# Patient Record
Sex: FEMALE | Race: WHITE | URBAN - METROPOLITAN AREA
[De-identification: names, ages, dates, MRNs, and addresses within clinical notes are randomized per-mention and may not be internally consistent; named-entity substitution may affect disease eponyms.]

---

## 2020-12-11 ENCOUNTER — OFFICE VISIT (OUTPATIENT)
Dept: PRIMARY CARE CLINIC | Age: 40
End: 2020-12-11
Payer: COMMERCIAL

## 2020-12-11 VITALS
TEMPERATURE: 98.6 F | RESPIRATION RATE: 18 BRPM | HEART RATE: 90 BPM | BODY MASS INDEX: 32.47 KG/M2 | SYSTOLIC BLOOD PRESSURE: 120 MMHG | DIASTOLIC BLOOD PRESSURE: 70 MMHG | OXYGEN SATURATION: 95 % | HEIGHT: 64 IN | WEIGHT: 190.2 LBS

## 2020-12-11 PROCEDURE — 99213 OFFICE O/P EST LOW 20 MIN: CPT | Performed by: NURSE PRACTITIONER

## 2020-12-11 RX ORDER — AMOXICILLIN 875 MG/1
875 TABLET, COATED ORAL 2 TIMES DAILY
Qty: 20 TABLET | Refills: 0 | Status: SHIPPED | OUTPATIENT
Start: 2020-12-11 | End: 2020-12-21

## 2020-12-11 RX ORDER — FLUCONAZOLE 150 MG/1
TABLET ORAL
Qty: 2 TABLET | Refills: 0 | Status: SHIPPED | OUTPATIENT
Start: 2020-12-11

## 2020-12-11 SDOH — ECONOMIC STABILITY: INCOME INSECURITY: HOW HARD IS IT FOR YOU TO PAY FOR THE VERY BASICS LIKE FOOD, HOUSING, MEDICAL CARE, AND HEATING?: NOT HARD AT ALL

## 2020-12-11 SDOH — HEALTH STABILITY: MENTAL HEALTH: HOW MANY STANDARD DRINKS CONTAINING ALCOHOL DO YOU HAVE ON A TYPICAL DAY?: 1 OR 2

## 2020-12-11 SDOH — ECONOMIC STABILITY: TRANSPORTATION INSECURITY
IN THE PAST 12 MONTHS, HAS LACK OF TRANSPORTATION KEPT YOU FROM MEETINGS, WORK, OR FROM GETTING THINGS NEEDED FOR DAILY LIVING?: NO

## 2020-12-11 SDOH — ECONOMIC STABILITY: FOOD INSECURITY: WITHIN THE PAST 12 MONTHS, YOU WORRIED THAT YOUR FOOD WOULD RUN OUT BEFORE YOU GOT MONEY TO BUY MORE.: NEVER TRUE

## 2020-12-11 SDOH — ECONOMIC STABILITY: FOOD INSECURITY: WITHIN THE PAST 12 MONTHS, THE FOOD YOU BOUGHT JUST DIDN'T LAST AND YOU DIDN'T HAVE MONEY TO GET MORE.: NEVER TRUE

## 2020-12-11 SDOH — ECONOMIC STABILITY: TRANSPORTATION INSECURITY
IN THE PAST 12 MONTHS, HAS THE LACK OF TRANSPORTATION KEPT YOU FROM MEDICAL APPOINTMENTS OR FROM GETTING MEDICATIONS?: NO

## 2020-12-11 ASSESSMENT — ENCOUNTER SYMPTOMS
TROUBLE SWALLOWING: 0
VOMITING: 0
EYE ITCHING: 0
ABDOMINAL DISTENTION: 0
SINUS PAIN: 0
COUGH: 0
WHEEZING: 0
CONSTIPATION: 0
SORE THROAT: 0
EYE REDNESS: 0
RHINORRHEA: 0
DIARRHEA: 0
SHORTNESS OF BREATH: 0
APNEA: 0

## 2020-12-11 ASSESSMENT — PATIENT HEALTH QUESTIONNAIRE - PHQ9
2. FEELING DOWN, DEPRESSED OR HOPELESS: 0
SUM OF ALL RESPONSES TO PHQ QUESTIONS 1-9: 0
1. LITTLE INTEREST OR PLEASURE IN DOING THINGS: 0
SUM OF ALL RESPONSES TO PHQ QUESTIONS 1-9: 0
SUM OF ALL RESPONSES TO PHQ QUESTIONS 1-9: 0
SUM OF ALL RESPONSES TO PHQ9 QUESTIONS 1 & 2: 0

## 2020-12-11 NOTE — PROGRESS NOTES
Subjective:      Patient ID: Manfred Zavala is a 45 y.o. female who presents today for:  Chief Complaint   Patient presents with    Lymphadenopathy     right side x 1 week     Health Maintenance    Otalgia     rt ear minimally x 5-6 days       Otalgia    There is pain in the right ear. This is a new problem. The current episode started in the past 7 days. The problem occurs every few hours. The problem has been unchanged. There has been no fever. The pain is at a severity of 1/10. Pain severity now: minimal pain. Pertinent negatives include no coughing, diarrhea, ear discharge, headaches, hearing loss, rash, rhinorrhea, sore throat or vomiting. She has tried nothing for the symptoms. There is no history of a chronic ear infection, hearing loss or a tympanostomy tube. Pt presents today with c/o right side swollen lymph node x week. Pt reports having her wisdom teeth pulled over a month ago and x 5 days noticed her rt ear started to hurt. Pt denies any rash, loss of smell/taste, diarrhea, weakness, sore throat, dizziness, wheezing, trouble breathing, swallowing. Pt denies any acute distress or recent illnesses noted. No past medical history on file. No past surgical history on file. Social History     Socioeconomic History    Marital status:      Spouse name: Not on file    Number of children: Not on file    Years of education: Not on file    Highest education level: Not on file   Occupational History    Not on file   Social Needs    Financial resource strain: Not hard at all    Food insecurity     Worry: Never true     Inability: Never true   [employer] needs     Medical: No     Non-medical: No   Tobacco Use    Smoking status: Current Every Day Smoker     Packs/day: 1.00     Years: 15.00     Pack years: 15.00     Types: Cigarettes     Start date: 1/1/2005    Smokeless tobacco: Never Used   Substance and Sexual Activity    Alcohol use:  Yes     Alcohol/week: 1.0 standard drinks Types: 1 Glasses of wine per week     Drinks per session: 1 or 2     Binge frequency: Less than monthly    Drug use: Never    Sexual activity: Yes     Partners: Male   Lifestyle    Physical activity     Days per week: Not on file     Minutes per session: Not on file    Stress: Not on file   Relationships    Social connections     Talks on phone: Not on file     Gets together: Not on file     Attends Restorationist service: Not on file     Active member of club or organization: Not on file     Attends meetings of clubs or organizations: Not on file     Relationship status: Not on file    Intimate partner violence     Fear of current or ex partner: Not on file     Emotionally abused: Not on file     Physically abused: Not on file     Forced sexual activity: Not on file   Other Topics Concern    Not on file   Social History Narrative    Not on file     No family history on file. No Known Allergies      Review of Systems   Constitutional: Negative for activity change, appetite change, chills, fatigue and fever. HENT: Positive for ear pain and postnasal drip. Negative for drooling, ear discharge, hearing loss, rhinorrhea, sinus pain, sore throat and trouble swallowing. Pt reports some ear pain noted to her rt ear x week ago     Eyes: Negative for redness, itching and visual disturbance. Respiratory: Negative for apnea, cough, shortness of breath and wheezing. Cardiovascular: Negative for chest pain. Gastrointestinal: Negative for abdominal distention, constipation, diarrhea and vomiting. Endocrine: Negative for heat intolerance. Genitourinary: Negative for difficulty urinating, dysuria and urgency. Musculoskeletal: Negative for gait problem, myalgias and neck stiffness. Skin: Negative for rash. Neurological: Negative for tremors, seizures, facial asymmetry and headaches. Hematological: Positive for adenopathy (rt lymp node). Psychiatric/Behavioral: Negative for confusion.    All other systems reviewed and are negative. Objective:   /70 (Site: Right Upper Arm, Position: Sitting, Cuff Size: Medium Adult)   Pulse 90   Temp 98.6 °F (37 °C) (Oral)   Resp 18   Ht 5' 4\" (1.626 m)   Wt 190 lb 3.2 oz (86.3 kg)   SpO2 95%   BMI 32.65 kg/m²     Physical Exam  Vitals signs and nursing note reviewed. Constitutional:       General: She is awake. She is not in acute distress. Appearance: Normal appearance. She is well-developed, well-groomed and normal weight. She is not ill-appearing, toxic-appearing or diaphoretic. HENT:      Head: Normocephalic and atraumatic. Right Ear: Hearing normal. No decreased hearing noted. No drainage or swelling. A middle ear effusion is present. There is no impacted cerumen. No mastoid tenderness. Tympanic membrane is injected, erythematous and bulging. Left Ear: Hearing normal. No decreased hearing noted. No drainage. There is impacted cerumen (pt noticed some left ear wax but denies the need for cleaning today abd reports she will do this at home ). Nose: Nose normal.      Mouth/Throat:      Mouth: Mucous membranes are moist.      Pharynx: Oropharynx is clear. Eyes:      Pupils: Pupils are equal, round, and reactive to light. Neck:      Musculoskeletal: Normal range of motion. Cardiovascular:      Rate and Rhythm: Normal rate and regular rhythm. Pulses: Normal pulses. Heart sounds: Normal heart sounds. No murmur. Pulmonary:      Effort: Pulmonary effort is normal. No tachypnea, bradypnea or respiratory distress. Breath sounds: Normal breath sounds. No stridor. No decreased breath sounds or wheezing. Chest:      Chest wall: No tenderness. Abdominal:      General: Abdomen is flat. Bowel sounds are normal. There is no distension. Palpations: Abdomen is soft. Tenderness: There is no abdominal tenderness. Musculoskeletal: Normal range of motion.    Lymphadenopathy:      Cervical: Cervical adenopathy present. Right cervical: Superficial cervical adenopathy present. Skin:     General: Skin is warm and dry. Capillary Refill: Capillary refill takes less than 2 seconds. Findings: No erythema or rash. Neurological:      General: No focal deficit present. Mental Status: She is alert and oriented to person, place, and time. Mental status is at baseline. Motor: No weakness. Coordination: Coordination normal.   Psychiatric:         Attention and Perception: Attention and perception normal.         Mood and Affect: Mood and affect normal.         Speech: Speech normal.         Behavior: Behavior normal. Behavior is cooperative. Thought Content: Thought content normal.         Judgment: Judgment normal.         Assessment:       Diagnosis Orders   1. Non-recurrent acute suppurative otitis media of right ear without spontaneous rupture of tympanic membrane  amoxicillin (AMOXIL) 875 MG tablet   2. Swelling of lymph node  amoxicillin (AMOXIL) 875 MG tablet   3. Antibiotic-induced yeast infection  fluconazole (DIFLUCAN) 150 MG tablet         Plan:      No orders of the defined types were placed in this encounter. Orders Placed This Encounter   Medications    amoxicillin (AMOXIL) 875 MG tablet     Sig: Take 1 tablet by mouth 2 times daily for 10 days     Dispense:  20 tablet     Refill:  0    fluconazole (DIFLUCAN) 150 MG tablet     Sig: Take one tablet 150 mg today and then take the other 150 mg tablet 3 days (72 hrs) later     Dispense:  2 tablet     Refill:  0     Pt was seen today and reported very minimal rt ear pain and swollen rt lymph node but pt has an ear infection with exam. Pt was treated with an oral ATB  Today and advised to keep her ears dry. Pt verbalized how to take the oral ATB. Pt reports she gets yeast infections with ATB's so oral Diflucan prescribed today for her to take. Pt understands how to take this.     Discussed signs and symptoms which require immediate follow-up in ED/call to 911. Patient verbalized understanding. Pt left the [facility] today in stable condition. Return if symptoms worsen or fail to improve. Reviewed with the patient: current clinical status, medications, activities and diet. Side effects, adverse effects of the medication prescribed today, as well as treatment plan and result expectations have been discussed with the patient who expresses understanding and desires to proceed. Close follow up to evaluate treatment results and for coordination of care. I have reviewed the patient's medical history in detail and updated the computerized patient record.       Norma Robert, APRN - CNP

## 2020-12-11 NOTE — PATIENT INSTRUCTIONS
Patient Education        Candidiasis: Care Instructions  Your Care Instructions  Candidiasis (say \"pss-gse-FP-uh-gideon\") is a yeast infection. Yeast normally lives in your body. But it can cause problems if your body's defenses don't work as they should. Some medicines can increase your chance of getting a yeast infection. These include antibiotics, steroids, and cancer drugs. And some diseases like AIDS and diabetes can make you more likely to get yeast infections. There are different types of yeast infections. Magnus Solorzano is a yeast infection in the mouth. It usually occurs in people with weak immune systems. It causes white patches inside the mouth and throat. Yeast infections of the skin usually occur in skin folds where the skin stays moist. They cause red, oozing patches on your skin. Babies can get these infections under the diaper. People who often wear gloves can get them on their hands. Many women get vaginal yeast infections. They are most common when women take antibiotics. These infections can cause the vagina to itch and burn. They also cause white discharge that looks like cottage cheese. In rare cases, yeast infects the blood. This can cause serious disease. This kind of infection is treated with medicine given through a needle into a vein (IV). After you start treatment, a yeast infection usually goes away quickly. But if your immune system is weak, the infection may come back. Tell your doctor if you get yeast infections often. Follow-up care is a key part of your treatment and safety. Be sure to make and go to all appointments, and call your doctor if you are having problems. It's also a good idea to know your test results and keep a list of the medicines you take. How can you care for yourself at home? · Take your medicines exactly as prescribed. Call your doctor if you think you are having a problem with your medicine. · Use antibiotics only as directed by your doctor.   · Eat yogurt with treatment and safety. Be sure to make and go to all appointments, and call your doctor if you are having problems. It's also a good idea to know your test results and keep a list of the medicines you take. How can you care for yourself at home? · Take baths until your doctor says you can take showers again. Avoid getting water in the ear until after the problem clears up. Ask your doctor if you should use earplugs to keep water out of your ears. · Do not swim until your doctor says you can. · If you get water in your ears, turn your head to each side and pull the earlobe in different directions. This will help the water run out. If your ears are still wet, use a hair dryer set on the lowest heat. Hold the dryer several inches from your ear. · Use your medicines exactly as prescribed. Call your doctor if you think you are having a problem with your medicine. Do not put drops in your ears unless your doctor prescribes them. When should you call for help? Watch closely for changes in your health, and be sure to contact your doctor if you have any problems. Where can you learn more? Go to https://Intuitive Solutionspepiceweb.Cloudike. org and sign in to your [employer] account. Enter V946 in the 3dplusme box to learn more about \"Keeping Ears Dry: Care Instructions. \"     If you do not have an account, please click on the \"Sign Up Now\" link. Current as of: April 15, 2020               Content Version: 12.6  © 2006-2020 [employer]. Care instructions adapted under license by [facility] [employer] [facility] ([facility]). If you have questions about a medical condition or this instruction, always ask your healthcare professional. [place] [address] any warranty or liability for your use of this information. Patient Education        Swollen Lymph Nodes: Care Instructions  Your Care Instructions     Lymph nodes are small, bean-shaped glands throughout the body.  They help your body fight germs and infections. Lymph nodes often swell when there is a problem such as an injury, infection, or tumor. · The nodes in your neck, under your chin, or behind your ears may swell when you have a cold or sore throat. · An injury or infection in a leg or foot can make the nodes in your groin swell. · Sometimes medicine can make lymph nodes swell, but this is rare. Treatment depends on what caused your nodes to swell. Usually the nodes return to normal size without a problem. Follow-up care is a key part of your treatment and safety. Be sure to make and go to all appointments, and call your doctor if you are having problems. It's also a good idea to know your test results and keep a list of the medicines you take. How can you care for yourself at home? · Take your medicines exactly as prescribed. Call your doctor if you think you are having a problem with your medicine. · Avoid irritation. ? Do not squeeze or pick at the lump. ? Do not stick a needle in it. · Prevent infection. Do not squeeze, drain, or puncture a painful lump. Doing this can irritate or inflame the lump, push any existing infection deeper into the skin, or cause severe bleeding. · Get extra rest. Slow down just a little from your usual routine. · Drink plenty of fluids, enough so that your urine is light yellow or clear like water. If you have kidney, heart, or liver disease and have to limit fluids, talk with your doctor before you increase the amount of fluids you drink. · Take an over-the-counter pain medicine, such as acetaminophen (Tylenol), ibuprofen (Advil, Motrin), or naproxen (Aleve). Read and follow all instructions on the label. · Do not take two or more pain medicines at the same time unless the doctor told you to. Many pain medicines have acetaminophen, which is Tylenol. Too much acetaminophen (Tylenol) can be harmful. When should you call for help?    Call your doctor now or seek immediate medical care if:    · You have worse symptoms of infection, such as:  ? Increased pain, swelling, warmth, or redness. ? Red streaks leading from the area. ? Pus draining from the area. ? A fever. Watch closely for changes in your health, and be sure to contact your doctor if:    · Your lymph nodes do not get smaller or do not return to normal.     · You do not get better as expected. Where can you learn more? Go to https://[employer] EntertainmentpeKaChing!.Th[employer] Stack. org and sign in to your [employer]InBlack account. Enter C851 in the Make YES! Happen box to learn more about \"Swollen Lymph Nodes: Care Instructions. \"     If you do not have an account, please click on the \"Sign Up Now\" link. Current as of: February 11, 2020               Content Version: 12.6  © 7381-9673 [employer], [employer]. Care instructions adapted under license by [facility] ([facility]). If you have questions about a medical condition or this instruction, always ask your healthcare professional. [place] [address] any warranty or liability for your use of this information. Discussed signs and symptoms which require immediate follow-up in ED/call to 911. Patient verbalized understanding. Antibiotic Instructions: Complete the full course of antibiotics as ordered. Take each dose with a small snack or meal to lessen potential GI upset. To prevent antibiotic resistance, please take medication as ordered and for the full duration even if you start to feel better. Consider intake of yogurt or probiotic during antibiotic use and for a few days after to help reduce the risk of developing a secondary infection. Separate the yogurt and antibiotic by at least 1 hour. Avoid alcohol while taking antibiotics.